# Patient Record
Sex: MALE | Race: WHITE
[De-identification: names, ages, dates, MRNs, and addresses within clinical notes are randomized per-mention and may not be internally consistent; named-entity substitution may affect disease eponyms.]

---

## 2017-01-06 ENCOUNTER — HOSPITAL ENCOUNTER (OUTPATIENT)
Age: 47
Discharge: HOME | End: 2017-01-06
Payer: COMMERCIAL

## 2017-01-06 DIAGNOSIS — R19.7: Primary | ICD-10-CM

## 2017-01-21 ENCOUNTER — HOSPITAL ENCOUNTER (OUTPATIENT)
Age: 47
Discharge: HOME | End: 2017-01-21
Payer: COMMERCIAL

## 2017-01-21 DIAGNOSIS — R19.7: Primary | ICD-10-CM

## 2017-02-03 ENCOUNTER — HOSPITAL ENCOUNTER (OUTPATIENT)
Age: 47
Discharge: HOME | End: 2017-02-03
Payer: COMMERCIAL

## 2017-02-03 DIAGNOSIS — R19.7: Primary | ICD-10-CM

## 2017-02-03 PROCEDURE — 78227 HEPATOBIL SYST IMAGE W/DRUG: CPT

## 2017-03-09 ENCOUNTER — HOSPITAL ENCOUNTER (OUTPATIENT)
Age: 47
Discharge: HOME | End: 2017-03-09
Payer: COMMERCIAL

## 2017-03-09 DIAGNOSIS — R19.7: Primary | ICD-10-CM

## 2017-03-09 DIAGNOSIS — Z87.891: ICD-10-CM

## 2017-03-09 DIAGNOSIS — Z80.0: ICD-10-CM

## 2017-03-09 DIAGNOSIS — K59.00: ICD-10-CM

## 2017-03-09 DIAGNOSIS — K52.9: ICD-10-CM

## 2017-03-09 DIAGNOSIS — D12.0: ICD-10-CM

## 2017-03-09 DIAGNOSIS — R23.3: ICD-10-CM

## 2017-03-09 DIAGNOSIS — Z82.49: ICD-10-CM

## 2017-03-09 PROCEDURE — 45380 COLONOSCOPY AND BIOPSY: CPT

## 2017-03-09 PROCEDURE — 0DBH8ZX EXCISION OF CECUM, VIA NATURAL OR ARTIFICIAL OPENING ENDOSCOPIC, DIAGNOSTIC: ICD-10-PCS | Performed by: SURGERY

## 2018-03-26 ENCOUNTER — HOSPITAL ENCOUNTER (OUTPATIENT)
Dept: HOSPITAL 76 - DI | Age: 48
Discharge: HOME | End: 2018-03-26
Attending: PHYSICIAN ASSISTANT
Payer: COMMERCIAL

## 2018-03-26 DIAGNOSIS — E04.1: Primary | ICD-10-CM

## 2018-03-26 PROCEDURE — 76536 US EXAM OF HEAD AND NECK: CPT

## 2018-03-27 NOTE — ULTRASOUND REPORT
THYROID ULTRASOUND:  03/26/2018

 

CLINICAL INDICATION:  Acute neck pain.

 

TECHNIQUE:  Real-time scanning was performed with representative static images obtained.

 

FINDINGS:  The right lobe measures 5.0 x 1.8 x 1.5 cm, and the left lobe measures 4.9 x 1.6 x 

1.2 cm.  The isthmus measures 3 mm.  In the lower pole of the right lobe, there is a 0.5 x 0.3 

x 0.3 cm hyperechoic avascular nodule.  No suspicious solid lesion is identified.  No 

adenopathy is seen.

 

IMPRESSION:  TINY HYPERECHOIC NODULE IN THE LOWER POLE OF THE RIGHT LOBE OF THE THYROID, A LOW 

SUSPICION FINDING BY CELESTINA CRITERIA.  BIOPSY IS NOT RECOMMENDED AT THIS SIZE.

 

 

DD: 03/27/2018 10:57

TD: 03/27/2018 11:18

Job #: [id number]

## 2018-03-31 ENCOUNTER — HOSPITAL ENCOUNTER (OUTPATIENT)
Dept: HOSPITAL 76 - DI | Age: 48
Discharge: HOME | End: 2018-03-31
Attending: PHYSICIAN ASSISTANT
Payer: COMMERCIAL

## 2018-03-31 DIAGNOSIS — M54.2: Primary | ICD-10-CM

## 2018-03-31 PROCEDURE — 70491 CT SOFT TISSUE NECK W/DYE: CPT

## 2018-04-02 NOTE — CT REPORT
EXAM:

CT SOFT TISSUE NECK WITH CONTRAST.

 

EXAM DATE: 3/31/2018 02:49 PM.

 

HISTORY: Neck pain, pain on both sides of the larynx for one month. No known injury.

 

COMPARISONS: None.

 

TECHNIQUE: Routine soft tissue neck CT protocol. Reconstructions: Coronal and sagittal. IV contrast: 
80 mL Isovue 300.

 

In accordance with CT protocol optimization, one or more of the following dose reduction techniques w
ere utilized for this exam: automated exposure control, adjustment of mA and/or KV based on patient s
ize, or use of iterative reconstructive technique.

 

FINDINGS:  

 

There is respiratory motion superimposed on the images of the lung apices. There is subsegmental atel
ectasis demonstrated within the bilateral lung apices. 

 

There is beam-hardening artifact from the patient's dental amalgam and or dental hardware obscuring p
ortions of the oropharynx, nasopharynx, oral cavity,  and parotid spaces, tongue and tongue
 base. 

 

The bilateral mastoid air cells are normally aerated. 

 

There is circumferential mucosal thickening in the right maxillary sinus. There is a mild degree of s
clerosis and thickening of the right maxillary sinus wall. Therefore this may reflect changes from a 
chronic right maxillary sinusitis. Recommend correlation with history.

 

There is mild mucosal thickening in the anterior right ethmoid complex. There is complete opacificati
on of the visualized portions of the right frontal sinus within the provided field of view. This like
ly represents a chronic right frontal sinusitis which would be secondary to obstruction at the level 
of the right frontal recess or this may reflect residual areas of sinus disease from the right-sided 
ostiomeatal unit pattern of obstruction.

 

The imaged portions of the brain exhibit normal cerebral volume and ventricular size.  There is toma
l enhancement within the deep venous sinuses. 

 

The imaged portions of the orbits are normal in appearance. 

 

The right and left parotid spaces enhance symmetrically. The  spaces exhibit symmetric dens
ities. 

 

The bilateral parapharyngeal spaces exhibit normal fat densities. The retropharyngeal space exhibits 
normal fat densities. 

 

The cervical and thoracic esophagus within the provided field of view is unremarkable. 

 

The palatine tonsils exhibit symmetric hypertrophy. The extrinsic and the intrinsic muscles of the to
ngue exhibit symmetric densities. 

 

The bilateral submandibular glands exhibit symmetric size and enhancement. The vallecula are symmetri
c. The lingual tonsils exhibit mild hypertrophy partially filling the vallecula. The free and fixed m
argins of the epiglottis are normal in appearance. The preepiglottic space and paraglottic spaces exh
ibit normal fat densities. The aryepiglottic folds and pyriform sinuses are symmetric. The false and 
true cords are normal in appearance. The arytenoid cartilage, cricoid cartilage, and the thyroid cart
ilages are normal in appearance. The subglottic space is normal. 

 

There is a small focus of hypodensity abutting the aortic arch only partially in the provided field o
f view. This may represent extension of the pericardial recess. This cannot be further evaluated on t
his exam.

 

IMPRESSION: 

1. There is no evidence of an enhancing neck mass or cervical adenopathy. 

2. There is mild thickening of the walls of the right maxillary sinus and right frontal sinus. There 
is opacification of the right frontal sinus and the right frontal recess. This may reflect either chr
onic right frontal sinusitis secondary to obstruction at the level of the right frontal recess or pot
entially could represent changes secondary to a right-sided ostiomeatal unit pattern of obstruction w
hich has partially resolved. Recommend correlation.

Referring Provider Line: [phone]

 

SITE ID: [id number]

## 2019-04-23 ENCOUNTER — HOSPITAL ENCOUNTER (OUTPATIENT)
Dept: HOSPITAL 76 - DI | Age: 49
Discharge: HOME | End: 2019-04-23
Attending: PHYSICIAN ASSISTANT
Payer: COMMERCIAL

## 2019-04-23 DIAGNOSIS — S63.092A: ICD-10-CM

## 2019-04-23 DIAGNOSIS — S63.592A: Primary | ICD-10-CM

## 2019-04-23 PROCEDURE — 25246 INJECTION FOR WRIST X-RAY: CPT

## 2019-04-23 PROCEDURE — 73222 MRI JOINT UPR EXTREM W/DYE: CPT

## 2019-04-23 PROCEDURE — 77002 NEEDLE LOCALIZATION BY XRAY: CPT

## 2019-04-24 NOTE — MRI REPORT
Reason:  OTH INJURIES OF LEFT WRIST, HAND AND FINGER(S), IN

Procedure Date:  04/23/2019   

Accession Number:  [id number] / [id number]                    

Procedure:  MRI - Arthrogram Wrist LT CPT Code:  

 

FULL RESULT:

 

 

EXAM:

LEFT Wrist MRI Arthrogram With Contrast

 

EXAM DATE: 4/23/2019 12:36 PM.

 

CLINICAL HISTORY: Other injuries of left wrist, hand and finger(s).Wrist 

pain, rule out tear.

 

COMPARISON: 01/20/2019.

 

TECHNIQUE: Multiplanar, multisequence T1-weighted and fluid-sensitive 

sequences of the wrist after an arthrographic injection of dilute 

gadolinium, dictated under a separate exam. Other: None.

 

FINDINGS:

Bones and articular surfaces: There is contrast throughout the 

radiocarpal compartment as well as the distal radioulnar joint. No 

osteochondral lesion. No significant articular cartilage defect. Mild 

cartilage thinning at the radioscaphoid articulation.

 

Musculotendinous structures: Mild ulnar subluxation of the extensor carpi 

ulnaris tendon. Visualized flexor and extensor tendons otherwise appear 

intact without significant tendinosis or tenosynovitis. No muscle edema, 

atrophy or fatty replacement.

 

Ligaments: There is no scapholunate or lunatotriquetral diastases. 

Perforation at the central disk of the triangular fibrocartilage.

IMPRESSION:

1. Tear at the central disk of the triangular fibrocartilage allowing 

contrast extension into the distal radioulnar joint.

2. Mild cord thinning at the radioscaphoid articulation.

3. Subluxation of the extensor carpi ulnaris tendon consistent with 

sub-sheath injury.

 

[facility]

## 2019-04-24 NOTE — XRAY REPORT
Reason:  OTH INJURIES OF LEFT WRIST, HAND AND FINGER(S), IN

Procedure Date:  04/23/2019   

Accession Number:  [id number] / [id number]                    

Procedure:  FL  - Arthrogram Needle Placement CPT Code:  

 

FULL RESULT:

 

 

EXAM:

Left Wrist Arthrographic Injection with Fluoroscopic Guidance

 

EXAM DATE: 4/23/2019 12:40 PM.

 

CLINICAL HISTORY: Other injuries of left wrist, hand and finger(s).

 

COMPARISON: ARTHROGRAM WRIST LT 04/23/2019 12:36 PM.

 

TECHNIQUE: The risks, benefits, and alternatives of the procedure were 

discussed with the patient. All questions were answered. Written and 

verbal consent were obtained. The radiocarpal joint was marked under 

fluoroscopy and prepped and draped in a sterile manner. Local anesthesia 

was performed with 1% lidocaine. A 25-gauge needle was then inserted into 

the radiocarpal joint. 3 mL of a solution containing 25% 1% lidocaine, 

25% iodinated contrast, and a 1:200 dilution of gadolinium contrast in 

sterile saline was then injected. The needle was removed without 

immediate complication. Other: None.

Fluoroscopy Time: 2 seconds.

Number of Images: 11.

 

FINDINGS:

Bones and joints: No fracture or subluxation.

 

Injection: Fluoroscopic images demonstrate needle placement and contrast 

in the radiocarpal joint.

IMPRESSION: Successful fluoroscopically guided arthrographic injection of 

the wrist.

 

[facility]

## 2020-04-15 ENCOUNTER — HOSPITAL ENCOUNTER (OUTPATIENT)
Dept: HOSPITAL 76 - COV | Age: 50
Discharge: HOME | End: 2020-04-15
Attending: FAMILY MEDICINE
Payer: COMMERCIAL

## 2020-04-15 DIAGNOSIS — J02.9: ICD-10-CM

## 2020-04-15 DIAGNOSIS — R05: Primary | ICD-10-CM

## 2020-04-15 DIAGNOSIS — R53.83: ICD-10-CM

## 2020-04-15 PROCEDURE — 81599 UNLISTED MAAA: CPT

## 2021-02-11 ENCOUNTER — HOSPITAL ENCOUNTER (OUTPATIENT)
Dept: HOSPITAL 76 - LAB.N | Age: 51
Discharge: HOME | End: 2021-02-11
Attending: PHYSICIAN ASSISTANT
Payer: COMMERCIAL

## 2021-02-11 DIAGNOSIS — Z00.00: Primary | ICD-10-CM

## 2021-02-11 DIAGNOSIS — Z12.5: ICD-10-CM

## 2021-02-11 LAB
ALBUMIN DIAFP-MCNC: 4.4 G/DL (ref 3.2–5.5)
ALBUMIN/GLOB SERPL: 1.6 {RATIO} (ref 1–2.2)
ALP SERPL-CCNC: 55 IU/L (ref 42–121)
ALT SERPL W P-5'-P-CCNC: 20 IU/L (ref 10–60)
ANION GAP SERPL CALCULATED.4IONS-SCNC: 6 MMOL/L (ref 6–13)
AST SERPL W P-5'-P-CCNC: 19 IU/L (ref 10–42)
BASOPHILS NFR BLD AUTO: 0 10^3/UL (ref 0–0.1)
BASOPHILS NFR BLD AUTO: 0.6 %
BILIRUB BLD-MCNC: 0.6 MG/DL (ref 0.2–1)
BUN SERPL-MCNC: 17 MG/DL (ref 6–20)
CALCIUM UR-MCNC: 9.2 MG/DL (ref 8.5–10.3)
CHLORIDE SERPL-SCNC: 103 MMOL/L (ref 101–111)
CHOLEST SERPL-MCNC: 160 MG/DL
CO2 SERPL-SCNC: 26 MMOL/L (ref 21–32)
CREAT SERPLBLD-SCNC: 0.9 MG/DL (ref 0.6–1.2)
EOSINOPHIL # BLD AUTO: 0.1 10^3/UL (ref 0–0.7)
EOSINOPHIL NFR BLD AUTO: 2.3 %
ERYTHROCYTE [DISTWIDTH] IN BLOOD BY AUTOMATED COUNT: 12.6 % (ref 12–15)
GLOBULIN SER-MCNC: 2.8 G/DL (ref 2.1–4.2)
GLUCOSE SERPL-MCNC: 94 MG/DL (ref 70–100)
HDLC SERPL-MCNC: 32 MG/DL
HDLC SERPL: 5 {RATIO} (ref ?–5)
HGB UR QL STRIP: 15 G/DL (ref 14–18)
LDLC SERPL CALC-MCNC: 82 MG/DL
LDLC/HDLC SERPL: 2.6 {RATIO} (ref ?–3.6)
LYMPHOCYTES # SPEC AUTO: 1.6 10^3/UL (ref 1.5–3.5)
LYMPHOCYTES NFR BLD AUTO: 30.8 %
MCH RBC QN AUTO: 32.7 PG (ref 27–31)
MCHC RBC AUTO-ENTMCNC: 35 G/DL (ref 32–36)
MCV RBC AUTO: 93.2 FL (ref 80–94)
MONOCYTES # BLD AUTO: 0.6 10^3/UL (ref 0–1)
MONOCYTES NFR BLD AUTO: 11.1 %
NEUTROPHILS # BLD AUTO: 2.9 10^3/UL (ref 1.5–6.6)
NEUTROPHILS # SNV AUTO: 5.2 X10^3/UL (ref 4.8–10.8)
NEUTROPHILS NFR BLD AUTO: 54.8 %
PDW BLD AUTO: 11.4 FL (ref 7.4–11.4)
PLATELET # BLD: 198 10^3/UL (ref 130–450)
PROT SPEC-MCNC: 7.2 G/DL (ref 6.7–8.2)
RBC MAR: 4.59 10^6/UL (ref 4.7–6.1)
VLDLC SERPL-SCNC: 46 MG/DL

## 2021-02-11 PROCEDURE — 36415 COLL VENOUS BLD VENIPUNCTURE: CPT

## 2021-02-11 PROCEDURE — 84153 ASSAY OF PSA TOTAL: CPT

## 2021-02-11 PROCEDURE — 83721 ASSAY OF BLOOD LIPOPROTEIN: CPT

## 2021-02-11 PROCEDURE — 80061 LIPID PANEL: CPT

## 2021-02-11 PROCEDURE — 80050 GENERAL HEALTH PANEL: CPT

## 2021-02-23 ENCOUNTER — HOSPITAL ENCOUNTER (OUTPATIENT)
Dept: HOSPITAL 76 - SDS | Age: 51
Discharge: HOME | End: 2021-02-23
Attending: SURGERY
Payer: COMMERCIAL

## 2021-02-23 VITALS — DIASTOLIC BLOOD PRESSURE: 102 MMHG | SYSTOLIC BLOOD PRESSURE: 135 MMHG

## 2021-02-23 DIAGNOSIS — Z80.0: ICD-10-CM

## 2021-02-23 DIAGNOSIS — Z86.010: ICD-10-CM

## 2021-02-23 DIAGNOSIS — Z87.891: ICD-10-CM

## 2021-02-23 DIAGNOSIS — K64.8: ICD-10-CM

## 2021-02-23 DIAGNOSIS — Z12.11: Primary | ICD-10-CM

## 2021-02-23 PROCEDURE — 45378 DIAGNOSTIC COLONOSCOPY: CPT

## 2022-03-17 ENCOUNTER — HOSPITAL ENCOUNTER (OUTPATIENT)
Dept: HOSPITAL 76 - LAB.N | Age: 52
Discharge: HOME | End: 2022-03-17
Attending: PHYSICIAN ASSISTANT
Payer: COMMERCIAL

## 2022-03-17 DIAGNOSIS — R19.7: Primary | ICD-10-CM

## 2022-03-17 LAB
ALBUMIN DIAFP-MCNC: 4.4 G/DL (ref 3.2–5.5)
ALBUMIN/GLOB SERPL: 1.4 {RATIO} (ref 1–2.2)
ALP SERPL-CCNC: 49 IU/L (ref 42–121)
ALT SERPL W P-5'-P-CCNC: 32 IU/L (ref 10–60)
ANION GAP SERPL CALCULATED.4IONS-SCNC: 9 MMOL/L (ref 6–13)
AST SERPL W P-5'-P-CCNC: 17 IU/L (ref 10–42)
BASOPHILS NFR BLD AUTO: 0 10^3/UL (ref 0–0.1)
BASOPHILS NFR BLD AUTO: 0.2 %
BILIRUB BLD-MCNC: 0.6 MG/DL (ref 0.2–1)
BUN SERPL-MCNC: 10 MG/DL (ref 6–20)
CALCIUM UR-MCNC: 8.7 MG/DL (ref 8.5–10.3)
CHLORIDE SERPL-SCNC: 104 MMOL/L (ref 101–111)
CO2 SERPL-SCNC: 25 MMOL/L (ref 21–32)
CREAT SERPLBLD-SCNC: 0.8 MG/DL (ref 0.6–1.2)
CRP SERPL-MCNC: < 1 MG/DL (ref 0–1)
EOSINOPHIL # BLD AUTO: 0.1 10^3/UL (ref 0–0.7)
EOSINOPHIL NFR BLD AUTO: 1.2 %
ERYTHROCYTE [DISTWIDTH] IN BLOOD BY AUTOMATED COUNT: 13.3 % (ref 12–15)
GFRSERPLBLD MDRD-ARVRAT: 101 ML/MIN/{1.73_M2} (ref 89–?)
GLOBULIN SER-MCNC: 3.2 G/DL (ref 2.1–4.2)
GLUCOSE SERPL-MCNC: 107 MG/DL (ref 70–100)
HCT VFR BLD AUTO: 42.9 % (ref 42–52)
HGB UR QL STRIP: 14.4 G/DL (ref 14–18)
LIPASE SERPL-CCNC: 33 U/L (ref 22–51)
LYMPHOCYTES # SPEC AUTO: 1.5 10^3/UL (ref 1.5–3.5)
LYMPHOCYTES NFR BLD AUTO: 17 %
MCH RBC QN AUTO: 31.8 PG (ref 27–31)
MCHC RBC AUTO-ENTMCNC: 33.6 G/DL (ref 32–36)
MCV RBC AUTO: 94.7 FL (ref 80–94)
MONOCYTES # BLD AUTO: 0.9 10^3/UL (ref 0–1)
MONOCYTES NFR BLD AUTO: 10.5 %
NEUTROPHILS # BLD AUTO: 6.1 10^3/UL (ref 1.5–6.6)
NEUTROPHILS # SNV AUTO: 8.6 X10^3/UL (ref 4.8–10.8)
NEUTROPHILS NFR BLD AUTO: 70.9 %
NRBC # BLD AUTO: 0 /100WBC
NRBC # BLD AUTO: 0 X10^3/UL
PDW BLD AUTO: 11.2 FL (ref 7.4–11.4)
PLATELET # BLD: 237 10^3/UL (ref 130–450)
POTASSIUM SERPL-SCNC: 3.7 MMOL/L (ref 3.5–5)
PROT SPEC-MCNC: 7.6 G/DL (ref 6.7–8.2)
RBC MAR: 4.53 10^6/UL (ref 4.7–6.1)
SODIUM SERPLBLD-SCNC: 138 MMOL/L (ref 135–145)

## 2022-03-17 PROCEDURE — 83690 ASSAY OF LIPASE: CPT

## 2022-03-17 PROCEDURE — 86140 C-REACTIVE PROTEIN: CPT

## 2022-03-17 PROCEDURE — 85025 COMPLETE CBC W/AUTO DIFF WBC: CPT

## 2022-03-17 PROCEDURE — 85651 RBC SED RATE NONAUTOMATED: CPT

## 2022-03-17 PROCEDURE — 36415 COLL VENOUS BLD VENIPUNCTURE: CPT

## 2022-03-17 PROCEDURE — 80053 COMPREHEN METABOLIC PANEL: CPT

## 2022-03-31 ENCOUNTER — HOSPITAL ENCOUNTER (OUTPATIENT)
Dept: HOSPITAL 76 - LAB.N | Age: 52
End: 2022-03-31
Attending: PHYSICIAN ASSISTANT
Payer: COMMERCIAL

## 2022-03-31 DIAGNOSIS — R19.7: Primary | ICD-10-CM

## 2022-03-31 LAB — H PYLORI AG STL QL IA.RAPID: NEGATIVE

## 2022-03-31 PROCEDURE — 87045 FECES CULTURE AEROBIC BACT: CPT

## 2022-03-31 PROCEDURE — 87449 NOS EACH ORGANISM AG IA: CPT

## 2022-03-31 PROCEDURE — 87329 GIARDIA AG IA: CPT

## 2022-03-31 PROCEDURE — 87427 SHIGA-LIKE TOXIN AG IA: CPT

## 2022-03-31 PROCEDURE — 87177 OVA AND PARASITES SMEARS: CPT

## 2022-03-31 PROCEDURE — 87338 HPYLORI STOOL AG IA: CPT

## 2022-03-31 PROCEDURE — 81599 UNLISTED MAAA: CPT

## 2022-03-31 PROCEDURE — 87493 C DIFF AMPLIFIED PROBE: CPT

## 2022-03-31 PROCEDURE — 83993 ASSAY FOR CALPROTECTIN FECAL: CPT

## 2022-03-31 PROCEDURE — 87209 SMEAR COMPLEX STAIN: CPT

## 2023-07-28 ENCOUNTER — HOSPITAL ENCOUNTER (OUTPATIENT)
Dept: HOSPITAL 76 - DI | Age: 53
Discharge: HOME | End: 2023-07-28
Attending: NURSE PRACTITIONER
Payer: COMMERCIAL

## 2023-07-28 DIAGNOSIS — M17.11: ICD-10-CM

## 2023-07-28 DIAGNOSIS — S86.911A: Primary | ICD-10-CM

## 2023-07-28 NOTE — XRAY REPORT
PROCEDURE:  Knee 4 View RT

 

INDICATIONS:  STRAIN

 

TECHNIQUE:  4 views of the right knee(s) were acquired.  

 

COMPARISON:  None.

 

FINDINGS:  

 

Bones:  No fractures or dislocations.  No suspicious bony lesions. Moderate medial compartment joint 
space narrowing. 

 

Soft tissues:  No knee joint effusion. No suspicious soft tissue calcifications or masses.  Degenerat
tamara vascular calcification noted in the calf

 

 

IMPRESSION:  

Medial compartment osteoarthritis

 

 

 

Reviewed by: Jefe Fairbanks MD on 7/28/2023 6:31 PM AKDT

Approved by: Jefe Fairbanks MD on 7/28/2023 6:31 PM AKDT

 

 

Station ID:  [id number]

## 2023-09-09 ENCOUNTER — HOSPITAL ENCOUNTER (OUTPATIENT)
Dept: HOSPITAL 76 - LAB.N | Age: 53
Discharge: HOME | End: 2023-09-09
Attending: PHYSICIAN ASSISTANT
Payer: COMMERCIAL

## 2023-09-09 DIAGNOSIS — Z12.5: ICD-10-CM

## 2023-09-09 DIAGNOSIS — Z00.00: Primary | ICD-10-CM

## 2023-09-09 LAB
ALBUMIN DIAFP-MCNC: 4.6 G/DL (ref 3.2–5.5)
ALBUMIN/GLOB SERPL: 1.6 {RATIO} (ref 1–2.2)
ALP SERPL-CCNC: 53 IU/L (ref 42–121)
ALT SERPL W P-5'-P-CCNC: 21 IU/L (ref 10–60)
ANION GAP SERPL CALCULATED.4IONS-SCNC: 5 MMOL/L (ref 6–13)
AST SERPL W P-5'-P-CCNC: 19 IU/L (ref 10–42)
BASOPHILS NFR BLD AUTO: 0.1 10^3/UL (ref 0–0.1)
BASOPHILS NFR BLD AUTO: 0.9 %
BILIRUB BLD-MCNC: 0.9 MG/DL (ref 0.2–1)
BUN SERPL-MCNC: 17 MG/DL (ref 6–20)
CALCIUM UR-MCNC: 9.7 MG/DL (ref 8.5–10.3)
CHLORIDE SERPL-SCNC: 106 MMOL/L (ref 101–111)
CHOLEST SERPL-MCNC: 156 MG/DL
CO2 SERPL-SCNC: 27 MMOL/L (ref 21–32)
CREAT SERPLBLD-SCNC: 0.8 MG/DL (ref 0.6–1.3)
EOSINOPHIL # BLD AUTO: 0.1 10^3/UL (ref 0–0.7)
EOSINOPHIL NFR BLD AUTO: 2.4 %
ERYTHROCYTE [DISTWIDTH] IN BLOOD BY AUTOMATED COUNT: 13.2 % (ref 12–15)
GFRSERPLBLD MDRD-ARVRAT: 100 ML/MIN/{1.73_M2} (ref 89–?)
GLOBULIN SER-MCNC: 2.8 G/DL (ref 2.1–4.2)
GLUCOSE SERPL-MCNC: 98 MG/DL (ref 74–104)
HCT VFR BLD AUTO: 45.1 % (ref 42–52)
HDLC SERPL-MCNC: 33 MG/DL
HDLC SERPL: 4.7 {RATIO} (ref ?–5)
HGB UR QL STRIP: 15.1 G/DL (ref 14–18)
LDLC SERPL CALC-MCNC: 86 MG/DL
LDLC/HDLC SERPL: 2.6 {RATIO} (ref ?–3.6)
LYMPHOCYTES # SPEC AUTO: 1.9 10^3/UL (ref 1.5–3.5)
LYMPHOCYTES NFR BLD AUTO: 35.4 %
MCH RBC QN AUTO: 31.7 PG (ref 27–31)
MCHC RBC AUTO-ENTMCNC: 33.5 G/DL (ref 32–36)
MCV RBC AUTO: 94.5 FL (ref 80–94)
MONOCYTES # BLD AUTO: 0.5 10^3/UL (ref 0–1)
MONOCYTES NFR BLD AUTO: 10.1 %
NEUTROPHILS # BLD AUTO: 2.7 10^3/UL (ref 1.5–6.6)
NEUTROPHILS # SNV AUTO: 5.4 X10^3/UL (ref 4.8–10.8)
NEUTROPHILS NFR BLD AUTO: 51 %
NRBC # BLD AUTO: 0 /100WBC
NRBC # BLD AUTO: 0 X10^3/UL
PDW BLD AUTO: 11.4 FL (ref 7.4–11.4)
PLATELET # BLD: 229 10^3/UL (ref 130–450)
POTASSIUM SERPL-SCNC: 4.6 MMOL/L (ref 3.5–4.5)
PROT SPEC-MCNC: 7.4 G/DL (ref 6.4–8.9)
RBC MAR: 4.77 10^6/UL (ref 4.7–6.1)
SODIUM SERPLBLD-SCNC: 138 MMOL/L (ref 135–145)
TRIGL P FAST SERPL-MCNC: 185 MG/DL (ref 48–352)
TSH SERPL-ACNC: 1.55 UIU/ML (ref 0.34–5.6)
VLDLC SERPL-SCNC: 37 MG/DL

## 2023-09-09 PROCEDURE — 80061 LIPID PANEL: CPT

## 2023-09-09 PROCEDURE — 84153 ASSAY OF PSA TOTAL: CPT

## 2023-09-09 PROCEDURE — 36415 COLL VENOUS BLD VENIPUNCTURE: CPT

## 2023-09-09 PROCEDURE — 80050 GENERAL HEALTH PANEL: CPT

## 2023-09-09 PROCEDURE — 83721 ASSAY OF BLOOD LIPOPROTEIN: CPT
